# Patient Record
Sex: MALE | Race: BLACK OR AFRICAN AMERICAN | NOT HISPANIC OR LATINO
[De-identification: names, ages, dates, MRNs, and addresses within clinical notes are randomized per-mention and may not be internally consistent; named-entity substitution may affect disease eponyms.]

---

## 2019-03-08 ENCOUNTER — OTHER (OUTPATIENT)
Age: 39
End: 2019-03-08

## 2019-03-08 ENCOUNTER — APPOINTMENT (OUTPATIENT)
Dept: TRANSPLANT | Facility: CLINIC | Age: 39
End: 2019-03-08

## 2019-03-08 PROBLEM — Z00.00 ENCOUNTER FOR PREVENTIVE HEALTH EXAMINATION: Status: ACTIVE | Noted: 2019-03-08

## 2019-03-11 ENCOUNTER — OTHER (OUTPATIENT)
Age: 39
End: 2019-03-11

## 2019-03-11 ENCOUNTER — APPOINTMENT (OUTPATIENT)
Dept: TRANSPLANT | Facility: CLINIC | Age: 39
End: 2019-03-11

## 2019-03-11 LAB — ABO + RH PNL BLD: NORMAL

## 2019-03-19 ENCOUNTER — OTHER (OUTPATIENT)
Age: 39
End: 2019-03-19

## 2019-05-06 ENCOUNTER — OTHER (OUTPATIENT)
Age: 39
End: 2019-05-06

## 2019-06-10 ENCOUNTER — LABORATORY RESULT (OUTPATIENT)
Age: 39
End: 2019-06-10

## 2019-06-10 ENCOUNTER — OTHER (OUTPATIENT)
Age: 39
End: 2019-06-10

## 2019-06-10 ENCOUNTER — APPOINTMENT (OUTPATIENT)
Dept: TRANSPLANT | Facility: CLINIC | Age: 39
End: 2019-06-10

## 2019-06-10 ENCOUNTER — APPOINTMENT (OUTPATIENT)
Dept: TRANSPLANT | Facility: CLINIC | Age: 39
End: 2019-06-10
Payer: SUBSIDIZED

## 2019-06-10 ENCOUNTER — APPOINTMENT (OUTPATIENT)
Dept: NEPHROLOGY | Facility: CLINIC | Age: 39
End: 2019-06-10
Payer: SUBSIDIZED

## 2019-06-10 VITALS
SYSTOLIC BLOOD PRESSURE: 123 MMHG | HEART RATE: 64 BPM | HEIGHT: 70 IN | BODY MASS INDEX: 27.2 KG/M2 | DIASTOLIC BLOOD PRESSURE: 68 MMHG | WEIGHT: 190 LBS

## 2019-06-10 DIAGNOSIS — Z00.5 ENCOUNTER FOR EXAMINATION OF POTENTIAL DONOR OF ORGAN AND TISSUE: ICD-10-CM

## 2019-06-10 LAB
ABO + RH PNL BLD: NORMAL
ALBUMIN SERPL ELPH-MCNC: 4.1 G/DL
ALP BLD-CCNC: 48 U/L
ALT SERPL-CCNC: 17 U/L
ANION GAP SERPL CALC-SCNC: 9 MMOL/L
APPEARANCE: CLEAR
APTT BLD: 35.8 SEC
AST SERPL-CCNC: 15 U/L
BACTERIA: NEGATIVE
BASOPHILS # BLD AUTO: 0.04 K/UL
BASOPHILS NFR BLD AUTO: 0.9 %
BILIRUB SERPL-MCNC: 0.5 MG/DL
BILIRUBIN URINE: NEGATIVE
BLOOD URINE: NEGATIVE
BUN SERPL-MCNC: 13 MG/DL
CALCIUM SERPL-MCNC: 9.3 MG/DL
CHLORIDE SERPL-SCNC: 104 MMOL/L
CHOLEST SERPL-MCNC: 193 MG/DL
CHOLEST/HDLC SERPL: 2.7 RATIO
CO2 SERPL-SCNC: 23 MMOL/L
COLOR: NORMAL
CREAT SERPL-MCNC: 0.98 MG/DL
CREAT SPEC-SCNC: 159 MG/DL
CREAT SPEC-SCNC: 159 MG/DL
CREAT/PROT UR: 0 RATIO
EOSINOPHIL # BLD AUTO: 0.32 K/UL
EOSINOPHIL NFR BLD AUTO: 7.1 %
ESTIMATED AVERAGE GLUCOSE: 114 MG/DL
GLUCOSE QUALITATIVE U: NEGATIVE
GLUCOSE SERPL-MCNC: 96 MG/DL
HBA1C MFR BLD HPLC: 5.6 %
HBV CORE IGG+IGM SER QL: NONREACTIVE
HBV SURFACE AB SER QL: REACTIVE
HBV SURFACE AG SER QL: NONREACTIVE
HCT VFR BLD CALC: 44.5 %
HCV AB SER QL: NONREACTIVE
HCV S/CO RATIO: 0.19 S/CO
HDLC SERPL-MCNC: 72 MG/DL
HEPATITIS A IGG ANTIBODY: NONREACTIVE
HGB BLD-MCNC: 13.8 G/DL
HIV1+2 AB SPEC QL IA.RAPID: NONREACTIVE
HYALINE CASTS: 0 /LPF
IMM GRANULOCYTES NFR BLD AUTO: 0.2 %
INR PPP: 0.98 RATIO
KETONES URINE: NEGATIVE
LDLC SERPL CALC-MCNC: 108 MG/DL
LEUKOCYTE ESTERASE URINE: NEGATIVE
LYMPHOCYTES # BLD AUTO: 2.15 K/UL
LYMPHOCYTES NFR BLD AUTO: 48 %
MAN DIFF?: NORMAL
MCHC RBC-ENTMCNC: 25.2 PG
MCHC RBC-ENTMCNC: 31 GM/DL
MCV RBC AUTO: 81.4 FL
MICROALBUMIN 24H UR DL<=1MG/L-MCNC: <1.2 MG/DL
MICROALBUMIN/CREAT 24H UR-RTO: NORMAL MG/G
MICROSCOPIC-UA: NORMAL
MONOCYTES # BLD AUTO: 0.41 K/UL
MONOCYTES NFR BLD AUTO: 9.2 %
NEUTROPHILS # BLD AUTO: 1.55 K/UL
NEUTROPHILS NFR BLD AUTO: 34.6 %
NITRITE URINE: NEGATIVE
PH URINE: 6
PHOSPHATE SERPL-MCNC: 3.8 MG/DL
PLATELET # BLD AUTO: 236 K/UL
POTASSIUM SERPL-SCNC: 4.5 MMOL/L
PROT SERPL-MCNC: 6.8 G/DL
PROT UR-MCNC: 5 MG/DL
PROTEIN URINE: NEGATIVE
PT BLD: 11.1 SEC
RBC # BLD: 5.47 M/UL
RBC # FLD: 13.3 %
RED BLOOD CELLS URINE: 2 /HPF
SODIUM SERPL-SCNC: 136 MMOL/L
SPECIFIC GRAVITY URINE: 1.02
SQUAMOUS EPITHELIAL CELLS: 0 /HPF
TRIGL SERPL-MCNC: 63 MG/DL
TSH SERPL-ACNC: 1.43 UIU/ML
UROBILINOGEN URINE: NORMAL
WBC # FLD AUTO: 4.48 K/UL
WHITE BLOOD CELLS URINE: 1 /HPF

## 2019-06-10 PROCEDURE — 99205 OFFICE O/P NEW HI 60 MIN: CPT

## 2019-06-10 PROCEDURE — 99203 OFFICE O/P NEW LOW 30 MIN: CPT

## 2019-06-11 LAB
CREAT 24H UR-MCNC: 2 G/24 H
CREAT ?TM UR-MCNC: 212 MG/DL
CREAT ?TM UR-MCNC: 215 MG/DL
MICROALBUMIN 24H UR DL<=1MG/L-MCNC: <1.2 MG/DL
MICROALBUMIN 24H UR DL<=1MG/L-MRATE: <11.4 MG/24HR
MICROALBUMIN ?TM UR-MRATE: <7.9 UG/MIN
PROT 24H UR-MRATE: 10 MG/DL
PROT 24H UR-MRATE: 9 MG/DL
PROT ?TM UR-MCNC: 24 HR
PROT UR-MCNC: 86 MG/24 H
PROT UR-MCNC: 95 MG/24 H
SPECIMEN VOL 24H UR: 950 ML

## 2019-06-12 LAB
BSA DERIVED: 1.73 M2
CREAT 24H UR-MCNC: 2 G/24 H
CREAT ?TM UR-MCNC: 212 MG/DL
CREAT CL 24H UR+SERPL-VRATE: 137 ML/MIN
FERRITIN SERPL-MCNC: 178 NG/ML
IRON SATN MFR SERPL: 38 %
IRON SERPL-MCNC: 102 UG/DL
M TB IFN-G BLD-IMP: NEGATIVE
PROT ?TM UR-MCNC: 24 HR
QUANTIFERON TB PLUS MITOGEN MINUS NIL: 8.62 IU/ML
QUANTIFERON TB PLUS NIL: 0.02 IU/ML
QUANTIFERON TB PLUS TB1 MINUS NIL: 0 IU/ML
QUANTIFERON TB PLUS TB2 MINUS NIL: -0.01 IU/ML
SPECIMEN VOL 24H UR: 950 ML
T PALLIDUM AB SER QL IA: NEGATIVE
TIBC SERPL-MCNC: 267 UG/DL
UIBC SERPL-MCNC: 165 UG/DL

## 2019-06-13 LAB
CMV IGG SERPL QL: 0.91 U/ML
CMV IGG SERPL-IMP: POSITIVE
CMV IGM SERPL QL: <8 AU/ML
CMV IGM SERPL QL: NEGATIVE
EBV EA AB SER IA-ACNC: <5 U/ML
EBV EA AB TITR SER IF: POSITIVE
EBV EA IGG SER QL IA: 74.4 U/ML
EBV EA IGG SER-ACNC: NEGATIVE
EBV EA IGM SER IA-ACNC: NEGATIVE
EBV PATRN SPEC IB-IMP: NORMAL
EBV VCA IGG SER IA-ACNC: 113 U/ML
EBV VCA IGM SER QL IA: <10 U/ML
EPSTEIN-BARR VIRUS CAPSID ANTIGEN IGG: POSITIVE
HSV 1+2 IGG SER IA-IMP: POSITIVE
HSV 1+2 IGG SER IA-IMP: POSITIVE
HSV1 IGG SER QL: 41.6 INDEX
HSV1 IGM SER QL: NORMAL TITER
HSV2 AB FLD-ACNC: NORMAL TITER
HSV2 IGG SER QL: 6.54 INDEX
RUBV IGG FLD-ACNC: 4.5 INDEX
RUBV IGG SER-IMP: POSITIVE
STRONGYLOIDES AB SER IA-ACNC: NEGATIVE
VZV AB TITR SER: POSITIVE
VZV IGG SER IF-ACNC: 708.2 INDEX

## 2019-06-15 ENCOUNTER — APPOINTMENT (OUTPATIENT)
Dept: CT IMAGING | Facility: CLINIC | Age: 39
End: 2019-06-15
Payer: SUBSIDIZED

## 2019-06-15 ENCOUNTER — OUTPATIENT (OUTPATIENT)
Dept: OUTPATIENT SERVICES | Facility: HOSPITAL | Age: 39
LOS: 1 days | End: 2019-06-15
Payer: SUBSIDIZED

## 2019-06-15 ENCOUNTER — APPOINTMENT (OUTPATIENT)
Dept: RADIOLOGY | Facility: CLINIC | Age: 39
End: 2019-06-15
Payer: SUBSIDIZED

## 2019-06-15 DIAGNOSIS — Z00.5 ENCOUNTER FOR EXAMINATION OF POTENTIAL DONOR OF ORGAN AND TISSUE: ICD-10-CM

## 2019-06-15 PROCEDURE — 71045 X-RAY EXAM CHEST 1 VIEW: CPT | Mod: 26

## 2019-06-15 PROCEDURE — 74174 CTA ABD&PLVS W/CONTRAST: CPT | Mod: 26

## 2019-06-15 PROCEDURE — 74174 CTA ABD&PLVS W/CONTRAST: CPT

## 2019-06-15 PROCEDURE — 71045 X-RAY EXAM CHEST 1 VIEW: CPT

## 2019-06-25 ENCOUNTER — OTHER (OUTPATIENT)
Age: 39
End: 2019-06-25

## 2022-09-15 ENCOUNTER — APPOINTMENT (OUTPATIENT)
Dept: ORTHOPEDIC SURGERY | Facility: CLINIC | Age: 42
End: 2022-09-15

## 2023-02-02 ENCOUNTER — NON-APPOINTMENT (OUTPATIENT)
Age: 43
End: 2023-02-02

## 2023-02-10 ENCOUNTER — NON-APPOINTMENT (OUTPATIENT)
Age: 43
End: 2023-02-10

## 2023-02-22 ENCOUNTER — APPOINTMENT (OUTPATIENT)
Dept: UROLOGY | Facility: CLINIC | Age: 43
End: 2023-02-22
Payer: COMMERCIAL

## 2023-02-22 VITALS
HEIGHT: 70 IN | BODY MASS INDEX: 27.92 KG/M2 | SYSTOLIC BLOOD PRESSURE: 129 MMHG | OXYGEN SATURATION: 97 % | WEIGHT: 195 LBS | HEART RATE: 72 BPM | DIASTOLIC BLOOD PRESSURE: 79 MMHG

## 2023-02-22 DIAGNOSIS — Z72.0 TOBACCO USE: ICD-10-CM

## 2023-02-22 DIAGNOSIS — Z78.9 OTHER SPECIFIED HEALTH STATUS: ICD-10-CM

## 2023-02-22 DIAGNOSIS — Z87.898 PERSONAL HISTORY OF OTHER SPECIFIED CONDITIONS: ICD-10-CM

## 2023-02-22 DIAGNOSIS — R39.89 OTHER SYMPTOMS AND SIGNS INVOLVING THE GENITOURINARY SYSTEM: ICD-10-CM

## 2023-02-22 PROCEDURE — 99204 OFFICE O/P NEW MOD 45 MIN: CPT

## 2023-02-22 NOTE — PHYSICAL EXAM
[Edema] : no peripheral edema [Respiration, Rhythm And Depth] : normal respiratory rhythm and effort [Exaggerated Use Of Accessory Muscles For Inspiration] : no accessory muscle use [Abdomen Soft] : soft [Abdomen Tenderness] : non-tender [Costovertebral Angle Tenderness] : no ~M costovertebral angle tenderness [FreeTextEntry1] : pvr 190 ml ( voided before ) [Normal Station and Gait] : the gait and station were normal for the patient's age [] : no rash [No Focal Deficits] : no focal deficits [Oriented To Time, Place, And Person] : oriented to person, place, and time [Affect] : the affect was normal [Mood] : the mood was normal [Not Anxious] : not anxious [No Palpable Adenopathy] : no palpable adenopathy

## 2023-02-22 NOTE — ASSESSMENT
[FreeTextEntry1] : paatitn with dysuria for 2 weeks - now OK \par states was seen by PCP and labs done \par after that had dysuria while deniying any luts or  exam by PCP\par states urne done ( reviewed on portal) normal \par and was give AZO\par then left to [place] for one week and done and sxs resolved \par now here and sxs resolved but wants reassurance:\par 1- check ua \par 2- UROFLWO showed normal bell pattern Flow rate of 19 ml/sec in 17 sec , voided 180 ml - pvr 10 ml \par 3- rtc as needed

## 2023-02-22 NOTE — HISTORY OF PRESENT ILLNESS
[FreeTextEntry1] : paatitn with dysuria for 2 weeks - now OK \par states was seen by PCP and labs done \par after that had dysuria while deniying any luts or  exam by PCP\par states urne done ( reviewed on portal) normal \par and was give AZO\par then left to [place] for one week and done and sxs resolved \par now here and sxs resolved but wants reassurance:

## 2023-02-24 LAB
APPEARANCE: CLEAR
BACTERIA: NEGATIVE
BILIRUBIN URINE: NEGATIVE
BLOOD URINE: NEGATIVE
COLOR: NORMAL
GLUCOSE QUALITATIVE U: NEGATIVE
HYALINE CASTS: 0 /LPF
KETONES URINE: NEGATIVE
LEUKOCYTE ESTERASE URINE: NEGATIVE
MICROSCOPIC-UA: NORMAL
NITRITE URINE: NEGATIVE
PH URINE: 8
PROTEIN URINE: NEGATIVE
RED BLOOD CELLS URINE: 1 /HPF
SPECIFIC GRAVITY URINE: 1.01
SQUAMOUS EPITHELIAL CELLS: 0 /HPF
UROBILINOGEN URINE: NORMAL
WHITE BLOOD CELLS URINE: 0 /HPF

## 2025-05-31 ENCOUNTER — NON-APPOINTMENT (OUTPATIENT)
Age: 45
End: 2025-05-31